# Patient Record
Sex: MALE
[De-identification: names, ages, dates, MRNs, and addresses within clinical notes are randomized per-mention and may not be internally consistent; named-entity substitution may affect disease eponyms.]

---
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
## 2023-03-07 ENCOUNTER — APPOINTMENT (OUTPATIENT)
Dept: OTOLARYNGOLOGY | Facility: CLINIC | Age: 18
End: 2023-03-07

## 2023-03-20 ENCOUNTER — APPOINTMENT (OUTPATIENT)
Dept: OTOLARYNGOLOGY | Facility: CLINIC | Age: 18
End: 2023-03-20
Payer: COMMERCIAL

## 2023-03-20 VITALS
BODY MASS INDEX: 28.88 KG/M2 | DIASTOLIC BLOOD PRESSURE: 69 MMHG | WEIGHT: 225 LBS | SYSTOLIC BLOOD PRESSURE: 117 MMHG | HEART RATE: 61 BPM | HEIGHT: 74 IN

## 2023-03-20 PROCEDURE — 99213 OFFICE O/P EST LOW 20 MIN: CPT

## 2023-03-20 NOTE — PHYSICAL EXAM
[Hearing Alonso Test (Tuning Fork On Forehead)] : no lateralization of tone [] : septum deviated to the right [Normal] : mucosa is normal [Midline] : trachea located in midline position [FreeTextEntry8] : dead skin and cerumen removed via curettage  [FreeTextEntry9] : dead skin and cerumen removed via curettage  [de-identified] : large swollen turbs [de-identified] : class 2 [de-identified] : type 3 oral cavity, large floppy uvula [FreeTextEntry2] : sinuses nontender to percussion.

## 2023-03-20 NOTE — ASSESSMENT
[FreeTextEntry1] : Reviewed and reconciled medications, allergies, PMHx, PSHx, SocHx, FMHx.\par \par Pt presents with h/o b/l OE. Pt present today for cerumen removal. \par \par Physical Exam -\par dead skin and cerumen removed via curettage b/l\par deviated septum right, large swollen turbs\par type 3 oral cavity, large floppy uvula, tonsils class 2\par \par Plan: \par Cerumen removed. Continue Hydrocortisone- Acetic Acid Drops. FU 4 months.

## 2023-03-20 NOTE — HISTORY OF PRESENT ILLNESS
[de-identified] : Pt presents with h/o b/l OE. Pt present today for cerumen removal. Pt notes his ears feel good. Notes they feel a lot less clogged since starting Hydrocortisone- Acetic Acid drops. Denies using q-tips.

## 2023-03-20 NOTE — ADDENDUM
[FreeTextEntry1] : Documented by Tanya Cleaning acting as scribe for Dr. Landin on 03/20/2023.\par \par All Medical record entries made by the scribe were at my, Dr. Landin,direction and personally dictated by me on 03/20/2023. I have reviewed the chart and agree that the record accurately reflects my personal performance of the history, physical exam, assessment and plan. I have also personally directed, reviewed, and agreed with the discharge instructions.

## 2023-07-24 ENCOUNTER — APPOINTMENT (OUTPATIENT)
Dept: OTOLARYNGOLOGY | Facility: CLINIC | Age: 18
End: 2023-07-24
Payer: COMMERCIAL

## 2023-07-24 VITALS
DIASTOLIC BLOOD PRESSURE: 49 MMHG | WEIGHT: 205 LBS | HEART RATE: 50 BPM | SYSTOLIC BLOOD PRESSURE: 98 MMHG | BODY MASS INDEX: 26.31 KG/M2 | HEIGHT: 74 IN

## 2023-07-24 DIAGNOSIS — Q38.1 ANKYLOGLOSSIA: ICD-10-CM

## 2023-07-24 PROCEDURE — 99213 OFFICE O/P EST LOW 20 MIN: CPT

## 2023-07-24 NOTE — ASSESSMENT
[FreeTextEntry1] : Reviewed and reconciled medications, allergies, PMHx, PSHx, SocHx, FMHx.\par \par Pt presents with h/o b/l OE. Pt present today for 4 month follow up on ears\par \par Physical Exam -\par dry skin, cerumen removed b/l, not impacted \par deviated septum right\par elongated floppy uvula, small cyst base of the uvula left, tongue tie\par \par Plan: \par Cerumen removed b/l. FU 4-6 months.

## 2023-07-24 NOTE — HISTORY OF PRESENT ILLNESS
[de-identified] : Pt presents with h/o b/l OE. Pt present today for 4 month follow up on ears. Pt notes he uses Hydrocortisone-Acetic Acid drops about once a week which helps.

## 2023-07-24 NOTE — PHYSICAL EXAM
[Hearing Alonso Test (Tuning Fork On Forehead)] : no lateralization of tone [] : septum deviated to the right [Midline] : trachea located in midline position [Normal] : orientation to person, place, and time: normal [FreeTextEntry8] : dry skin, cerumen removed via curettage, not impacted  [FreeTextEntry9] : dry skin, cerumen removed via curettage, not impacted  [de-identified] : tongue tie [de-identified] : mildly enlarged [de-identified] : elongated floppy uvula, small cyst base of the uvula left [FreeTextEntry2] : sinuses nontender to percussion. sensations intact.

## 2023-07-24 NOTE — CONSULT LETTER
[Dear  ___] : Dear  [unfilled], [Consult Letter:] : I had the pleasure of evaluating your patient, [unfilled]. [Please see my note below.] : Please see my note below. [Consult Closing:] : Thank you very much for allowing me to participate in the care of this patient.  If you have any questions, please do not hesitate to contact me. [Sincerely,] : Sincerely, [FreeTextEntry3] : Jorge Landin MD FACS

## 2023-07-24 NOTE — ADDENDUM
[FreeTextEntry1] : Documented by Tanya Cleaning acting as scribe for Dr. Landin on 07/24/2023.\par \par All Medical record entries made by the scribe were at my, Dr. Landin,direction and personally dictated by me on 07/24/2023. I have reviewed the chart and agree that the record accurately reflects my personal performance of the history, physical exam, assessment and plan. I have also personally directed, reviewed, and agreed with the discharge instructions.

## 2024-01-08 ENCOUNTER — APPOINTMENT (OUTPATIENT)
Dept: OTOLARYNGOLOGY | Facility: CLINIC | Age: 19
End: 2024-01-08
Payer: COMMERCIAL

## 2024-01-08 VITALS
HEART RATE: 50 BPM | WEIGHT: 230 LBS | BODY MASS INDEX: 29.52 KG/M2 | SYSTOLIC BLOOD PRESSURE: 116 MMHG | DIASTOLIC BLOOD PRESSURE: 72 MMHG | HEIGHT: 74 IN

## 2024-01-08 DIAGNOSIS — J31.0 CHRONIC RHINITIS: ICD-10-CM

## 2024-01-08 DIAGNOSIS — K13.79 OTHER LESIONS OF ORAL MUCOSA: ICD-10-CM

## 2024-01-08 DIAGNOSIS — H60.63 UNSPECIFIED CHRONIC OTITIS EXTERNA, BILATERAL: ICD-10-CM

## 2024-01-08 DIAGNOSIS — J34.2 DEVIATED NASAL SEPTUM: ICD-10-CM

## 2024-01-08 PROCEDURE — 99213 OFFICE O/P EST LOW 20 MIN: CPT

## 2024-01-08 NOTE — HISTORY OF PRESENT ILLNESS
[de-identified] : Patient with h/o b/l OE presents today for cerumen removal. Patient states he is doing well. Denies feeling clogged at the moment. States he is using the Hydrocortisone Acetic Acid drops less frequently now.

## 2024-01-08 NOTE — PHYSICAL EXAM
[Hearing Loss Right Only] : normal [Hearing Loss Left Only] : normal [FreeTextEntry8] : hard dry cerumen removed via curettage  [FreeTextEntry9] : hard dry cerumen removed via curettage  [] : septum deviated to the right [de-identified] : mildly inflamed turbinates  [Midline] : trachea located in midline position [de-identified] : large edematous uvula [de-identified] : class 2 [Normal] : no rashes

## 2024-01-08 NOTE — ASSESSMENT
[FreeTextEntry1] :  Reviewed and reconciled medications, allergies, PMHx, PSHx, SocHx, FMHx   Patient with h/o b/l OE presents today for cerumen removal. Patient states he is doing well. Denies feeling clogged at the moment. States he is using the Hydrocortisone Acetic Acid drops less frequently now.   Physical Exam: -right ear: hard dry cerumen removed via curettage  -left ear: hard dry cerumen removed via curettage  -large edematous uvula -tonsils class 2 - mildly inflamed turbinates  -deviated septum right  Plan: FU 6 months

## 2024-06-28 ENCOUNTER — APPOINTMENT (OUTPATIENT)
Dept: OTOLARYNGOLOGY | Facility: CLINIC | Age: 19
End: 2024-06-28

## 2025-06-24 ENCOUNTER — APPOINTMENT (OUTPATIENT)
Dept: OTOLARYNGOLOGY | Facility: CLINIC | Age: 20
End: 2025-06-24
Payer: COMMERCIAL

## 2025-06-24 ENCOUNTER — NON-APPOINTMENT (OUTPATIENT)
Age: 20
End: 2025-06-24

## 2025-06-24 VITALS
BODY MASS INDEX: 29.52 KG/M2 | DIASTOLIC BLOOD PRESSURE: 76 MMHG | HEIGHT: 74 IN | WEIGHT: 230 LBS | SYSTOLIC BLOOD PRESSURE: 121 MMHG | HEART RATE: 65 BPM

## 2025-06-24 PROCEDURE — 99213 OFFICE O/P EST LOW 20 MIN: CPT | Mod: 25
